# Patient Record
Sex: FEMALE | Race: WHITE | NOT HISPANIC OR LATINO | Employment: FULL TIME | URBAN - METROPOLITAN AREA
[De-identification: names, ages, dates, MRNs, and addresses within clinical notes are randomized per-mention and may not be internally consistent; named-entity substitution may affect disease eponyms.]

---

## 2020-05-23 ENCOUNTER — VIRTUAL VISIT (OUTPATIENT)
Dept: FAMILY MEDICINE | Facility: OTHER | Age: 35
End: 2020-05-23

## 2020-05-24 NOTE — PROGRESS NOTES
"Date: 2020 14:25:03  Clinician: Jimy Servin  Clinician NPI: [id number]  Patient: Claudia Tidwell  Patient : 1985  Patient Address: [address] [place] [address] [address], [place] [address]  Patient Phone: [phone]  Visit Protocol: Sleep  Patient Summary:  Claudia is a 35 year old ( : 1985 ) female who initiated a [facility] for sleep problems. When asked the question \"Please sign me up to receive news, health information and promotions from [facility].\", Claudia responded \"No\".    Phone number: [phone]   The patient DENIES:     Stopping breathing or holding breath while sleeping    Snoring most nights    Waking from sleep out of breath or with dyspnea     Delayed Phase Sleep Disorder  The patient AFFIRMS:   Having a different sleep schedule on work days compared to off days    The patient DENIES:     Sleeping better when the patient can stay up as late as he/she wants without having to get up in the morning    Sleeping much better on the weekend     Restless Leg Syndrome  The patient AFFIRMS:   That his/her legs / body bother him/her most when sitting or lying down    Being unable get comfortable enough to fall asleep    The patient DENIES:     That his/her legs are only comfortable when up and moving around    Achiness or discomfort in legs keeps him/her from sleeping     Chronic Pain  The patient DENIES:   Pain is the main reason he/she has trouble sleeping   Mental health  The patient DENIES:   Having been hospitalized for mental illness   Shift Work Sleep Disorder  The patient AFFIRMS:   Working different shifts that change during the week or month    Often working the night shift    Occasionally working during the time he/she should normally be sleeping    The patient DENIES:   Psychophysiological Insomnia  The patient AFFIRMS:   Worrying about job, family, or friends    Contemplating his/her responsibilities at night    Worrying about how he/she will feel the next day    The " patient DENIES:     Having so many problems that he/she does not know what to do    Feeling overwhelmed    Feeling pessimistic and hopeless about the future    Feeling very anxious before going to bed    Thinking about a million different things when lying in bed     Significant Negatives  The patient reports not having been previously diagnosed with:     Sleep apnea    Restless legs syndrome    Narcolepsy     The patient denies:     Difficulty breathing during the day    Heart failure    Neuromuscular disease    Feeling hopeless    Feeling as if medication or a personal consultation is immediately necessary     Additional information as reported by the patient (free text): I experience involuntary hand, arm, and leg movements while trying to fall asleep. They are short intervals that stop on their own. It keeps me awake longer. I don't take any drugs, smoke, or drink. I pace in my home as much as I can, but am otherwise sedentary. I work two long shifts on the weekends and it's almost impossible to sleep enough between the two. Lately, I have been working 48hr without sleep. 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   Reason for repeat visit for the same protocol within 24 hours:  I didn't understand the second set of questions. I need help with an ongoing sleeping problem which I think needs medication, but I do not need to be tranquilized at a hospital.  See the History of referred by protocol and completed visits section for additional details on the previous visit.    MEDICATIONS: Consuelo oral, ALLERGIES: NKDA  Clinician Response:  Severiano Taylor,  Based upon how you responded to our questions it is possible:     You have restless leg type symptoms    You have insomnia    Working irregular shifts plays a role in your difficulty sleeping     Restless Leg Syndrome  The feeling in your legs is something that is described in many different ways, including chapis,  jumpy, twitchy, pins-and-needles, aching, painful, numb, restless, etc. This type of problem tends to be most noticeable when you are sitting still or doing something quiet like watching TV. It is also more common in the evening hours and around bed time and generally feels better if you can get up and stretch or walk around. There are many causes of Restless Leg Syndrome but people can also have this problem without a specific cause.  Since Restless Leg Syndrome is most often treated with medications, a sleep medicine provider will contact you to discuss therapy.   Work Shift Sleep Disorder  Based upon how you responded to our questions it is possible that working irregular, rotating or night shifts may be playing a significant role in your insomnia complaints. Each of us has a built in tendency to either stay up late at night or get up early in the morning. Being a 'night owl' or 'early bird' is a function of our internal body clock, called your circadian rhythm. When you are forced to keep a sleep schedule that goes against your typical habits (because a job, school or other responsibilities) insomnia can be the result. This is particularly true for people who have to work during hours when most other people would be asleep. Our bodies function best when we can regularly keep the sleep pattern or habits that are most in tune with our internal body clock. If we keep different patterns or irregular sleep and wake up patterns, problems can develop.  Most people who do shift work are not getting enough sleep. The solution for each person is tailored to their natural sleep schedule and their desired work schedule and requires counseling from a sleep specialist. In some cases, medication may be necessary. Please schedule an appointment with a sleep provider.  The first step before your appointment is to keep an accurate diary of your sleep schedule for two weeks before being seen.You can get a sleep diary form by  clicking here or you can use a mobile phone sleep monitoring available on some smartphones.   Insomnia:  Insomnia is generally the inability to sleep when you want to be asleep. There are many types of insomnia, which can include difficulty falling to sleep, waking frequently during the night, difficulty getting back to sleep, waking too early in the morning, or a feeling of shallow, poor quality sleep. This type of pattern can develop for any number of reasons, such as a high stress situation, but most often does not have a specific cause. But the longer that the insomnia last the more likely that our body gets trained to have insomnia. What this means is that when you lie awake at times during the night, it is common for your mind to get overactive. If you lay there long enough, you get annoyed or frustrated with sleep, but may start to ruminate about many different topics and situations. When this happens over a long time, then your body gets accustomed or use to having this happen and hence it makes it more likely to happen. This is one of the major reasons people can feel drowsy while reading or watching TV in the evening but when you go to bed you can feel wide awake. Once sleep becomes a problem, people often 'try harder' to sleep, which most often just makes the problem worse. As you well know, you can't make yourself sleep.  Your insomnia may respond to relaxation exercises, changes in your bedtime routine, or methods to learn to let go of worry and planning. Some methods of treatment can be done on the computer. Often the decision of which method will work for you can be determined by meeting with a sleep specialist. Listed below are some relaxation strategies you can access over the internet.   A common problem for people with insomnia is spending too much time in bed 'trying' to sleep. You really should only be in bed for no more than 7-8 hours per night and should go to bed when you are naturally  sleepy. Spending too much time in bed can lead to being awake and having an 'overactive' mind. One effective way to address this problem is reducing how much time you spend in bed each night. Be careful with driving or other dangerous activities when trying these strategies however. We recommend that you follow these steps to improve your ability to get to sleep and stay asleep:     Set a new sleep schedule where you only spend as much time in bed as you actually sleep during the night. For example, if you spend 8 hours in bed but only sleep 6 hours per night, then only spend 6 hours in bed.    Keep this sleep schedule every day of the week including the weekends for about two weeks.    After two weeks you can add 30 minutes more time in bed if you have been getting to sleep more easily and sleeping more soundly.    If you still aren't sleeping well, reduce the time you spend in bed by 30 minutes but not less than 5 hours per night and keep this schedule for another week or two.    As you are trying these strategies, keep a sleep log to track how well you are keeping consistent sleep patterns.     CAUTION: When using this type of strategy it is important that you aren't doing anything dangerous and are able to drive without becoming drowsy.  When someone lays awake in bed over many nights, your body can actually learn to be awake in bed, mainly because that is what has happened so often. Your body has actually been 'conditioned' or trained to be awake during the night because it has happened over months or years. To break this habit you should try to follow these steps to improve your insomnia:     Set a strict bedtime and rise time with about 8 hours in bed, and keep this schedule every day of the week, including weekends, for example, a bedtime of 10 PM and a wake up time of 6 AM.    Go to bed at the time you picked but only if you are sleepy (falling to sleep and not just tired or fatigued)    Don't lie in bed  for more than 15-30 minutes if you can't sleep. Get up and go do something relaxing like reading until you become drowsy again and then go back to bed.    Get up at the time you picked every day regardless of how much sleep you get that night.    Use the bedroom only for sleep and sex. Do NOT watch TV, read, use the computer, play games on your cell phone or do work while in bed.    Do not take naps during the daytime and avoid any situations where you might get drowsy or fall asleep unintentionally especially in the evening.     The first step before your appointment is to keep an accurate diary of your sleep schedule for two weeks before being seen. You can get a sleep diary form by clicking here or you can use a mobile phone sleep monitoring available on some smartphones.  Relaxation strategies can help slow someone's mind down during the night. Click on the link below to try some relaxation exercises. Remember that relaxation is a skill to be developed so don't expect yourself to be good at these exercises immediately.   http://www.[facility].org/Services/SleepMedicine/Audio/index.htm   Good Sleep Habits     Your bedroom is for sleeping and should be quiet, comfortably cool and dark before you lie down.    You should not have electronic devices such as phones or computers in your bedroom.    Leave electronic equipment in another room on a  if possible as the light and engagement activate the brain and delay or disrupt sleep.    Your bedtime and awakening time should fit your natural tendency to fall asleep and wake up and should not vary much between weekdays and weekends.    No caffeine within 6 hours or alcohol within two hours of bedtime. A good rule is no caffeine after 3 PM.     Additional Information on Sleep  Why do we sleep?  Sleep science tells us that sleep is a biologic necessity that is required to:     Clear toxins from the brain    Remove unnecessary neural connections that accumulate during  the day    Enhance memory and learning     Without adequate sleep, our brain may become impaired in a manner that is similar to being intoxicated.  How much sleep do we need?  The average adult needs 7-8 hours of sleep while young adolescents need up to 9 hours in order to function at their best. Teens have a delay in bedtime that is biology while older adults go to bed earlier.  Aim for 7-8 sleep and a regular schedule; it may take a week or more to eliminate the effects of chronic insufficient sleep.  How should I schedule when to go to sleep?  The timing of sleep is determined genetically for each of us. Some of us are night owls and go to bed late or larks and get up early. The timing of sleep and the amount of sleep we need changes with our age. Try to schedule your sleep time to fit your natural time for feeling sleepy and awakening without an alarm.  What if my sleep schedule does not fit my work schedule?  If you have no trouble falling asleep or getting up during the work week, your work schedule is probably well-matched to your natural sleep schedule. If your sleep is different when working than on vacation or days not working, you may need help from a sleep specialist to adjust your sleep schedule.   Please go to your primary care provider or an urgent care immediately if your sleep problems become too overwhelming for you.   Diagnosis: Restless leg syndrome possible  Diagnosis ICD: G25.81

## 2020-06-05 ENCOUNTER — VIRTUAL VISIT (OUTPATIENT)
Dept: FAMILY MEDICINE | Facility: CLINIC | Age: 35
End: 2020-06-05
Payer: COMMERCIAL

## 2020-06-05 DIAGNOSIS — G47.00 INSOMNIA, UNSPECIFIED TYPE: Primary | ICD-10-CM

## 2020-06-05 PROCEDURE — 99203 OFFICE O/P NEW LOW 30 MIN: CPT | Mod: 95 | Performed by: FAMILY MEDICINE

## 2020-06-05 RX ORDER — TRAZODONE HYDROCHLORIDE 50 MG/1
50-150 TABLET, FILM COATED ORAL AT BEDTIME
Qty: 30 TABLET | Refills: 1 | Status: SHIPPED | OUTPATIENT
Start: 2020-06-05 | End: 2023-11-16

## 2020-06-05 RX ORDER — NORETHINDRONE 0.35 MG/1
TABLET ORAL
COMMUNITY
Start: 2020-05-19

## 2020-06-05 SDOH — HEALTH STABILITY: MENTAL HEALTH: HOW OFTEN DO YOU HAVE A DRINK CONTAINING ALCOHOL?: NEVER

## 2020-06-05 NOTE — PROGRESS NOTES
"Claudia Tidwell is a 35 year old female who is being evaluated via a billable telephone visit.      The patient has been notified of following:     \"This telephone visit will be conducted via a call between you and your physician/provider. We have found that certain health care needs can be provided without the need for a physical exam.  This service lets us provide the care you need with a short phone conversation.  If a prescription is necessary we can send it directly to your pharmacy.  If lab work is needed we can place an order for that and you can then stop by our lab to have the test done at a later time.    Telephone visits are billed at different rates depending on your insurance coverage. During this emergency period, for some insurers they may be billed the same as an in-person visit.  Please reach out to your insurance provider with any questions.    If during the course of the call the physician/provider feels a telephone visit is not appropriate, you will not be charged for this service.\"    Patient has given verbal consent for Telephone visit?  Yes    What phone number would you like to be contacted at? [phone]    How would you like to obtain your AVS? [employer] sent link so she can activate [employer].      Subjective     Claudia Tidwell is a 35 year old female who presents via phone visit today for the following health issues:      HPI   Moved to [place] 2017 was over seas for 10 years.   Patient has not had insurance for a while and know she needs to schedule a physical.   Patient states no Chronic health problems.    Concern - sleep issues  Onset: long time more than several months.    Description:   Trouble staying and falling asleep, exhausted during the day. Patient  staring to get irritated  for no reason. Issuse with career choice and family issues     Progression of Symptoms:  worsening    Accompanying Signs & Symptoms:  NA    Previous history of similar problem:   na    Precipitating factors: "   Worsened by: swing shift    Alleviating factors:  Improved by:     Therapies Tried and outcome: has tried meditation, cut caffeine and this has not been to helpful.         Reviewed and updated as needed this visit by Provider         Review of Systems   Constitutional, HEENT, cardiovascular, pulmonary, gi and gu systems are negative, except as otherwise noted.       Objective   Reported vitals:  There were no vitals taken for this visit.   healthy, alert and no distress  PSYCH: Alert and oriented times 3; coherent speech, normal   rate and volume, able to articulate logical thoughts, able   to abstract reason, no tangential thoughts, no hallucinations   or delusions  Her affect is normal  RESP: No cough, no audible wheezing, able to talk in full sentences  Remainder of exam unable to be completed due to telephone visits    Diagnostic Test Results:  Labs reviewed in Epic        Assessment/Plan:  1. Insomnia, unspecified type    - traZODone (DESYREL) 50 MG tablet; Take 1-3 tablets ( mg) by mouth At Bedtime  Dispense: 30 tablet; Refill: 1    We will do a trial of trazodone with the [facility] follow-up in a week or 2    No follow-ups on file.      Phone call duration: 9 minutes    Frances Avalos DO

## 2021-01-03 ENCOUNTER — HEALTH MAINTENANCE LETTER (OUTPATIENT)
Age: 36
End: 2021-01-03

## 2021-10-10 ENCOUNTER — HEALTH MAINTENANCE LETTER (OUTPATIENT)
Age: 36
End: 2021-10-10

## 2022-01-29 ENCOUNTER — HEALTH MAINTENANCE LETTER (OUTPATIENT)
Age: 37
End: 2022-01-29

## 2022-09-18 ENCOUNTER — HEALTH MAINTENANCE LETTER (OUTPATIENT)
Age: 37
End: 2022-09-18

## 2023-05-07 ENCOUNTER — HEALTH MAINTENANCE LETTER (OUTPATIENT)
Age: 38
End: 2023-05-07

## 2023-11-16 ENCOUNTER — OFFICE VISIT (OUTPATIENT)
Dept: MIDWIFE SERVICES | Facility: CLINIC | Age: 38
End: 2023-11-16
Payer: COMMERCIAL

## 2023-11-16 VITALS
HEIGHT: 64 IN | BODY MASS INDEX: 50.02 KG/M2 | SYSTOLIC BLOOD PRESSURE: 124 MMHG | DIASTOLIC BLOOD PRESSURE: 80 MMHG | WEIGHT: 293 LBS

## 2023-11-16 DIAGNOSIS — N92.6 IRREGULAR PERIODS: Primary | ICD-10-CM

## 2023-11-16 PROBLEM — E66.01 MORBID OBESITY (H): Status: ACTIVE | Noted: 2023-06-19

## 2023-11-16 PROBLEM — G47.33 OBSTRUCTIVE SLEEP APNEA TREATED WITH CONTINUOUS POSITIVE AIRWAY PRESSURE (CPAP): Status: ACTIVE | Noted: 2023-08-15

## 2023-11-16 PROBLEM — R73.03 PREDIABETES: Status: ACTIVE | Noted: 2023-06-20

## 2023-11-16 LAB
ERYTHROCYTE [DISTWIDTH] IN BLOOD BY AUTOMATED COUNT: 12.9 % (ref 10–15)
HCT VFR BLD AUTO: 42.9 % (ref 35–47)
HGB BLD-MCNC: 13.8 G/DL (ref 11.7–15.7)
MCH RBC QN AUTO: 28.8 PG (ref 26.5–33)
MCHC RBC AUTO-ENTMCNC: 32.2 G/DL (ref 31.5–36.5)
MCV RBC AUTO: 89 FL (ref 78–100)
PLATELET # BLD AUTO: 277 10E3/UL (ref 150–450)
RBC # BLD AUTO: 4.8 10E6/UL (ref 3.8–5.2)
WBC # BLD AUTO: 9.5 10E3/UL (ref 4–11)

## 2023-11-16 PROCEDURE — 85027 COMPLETE CBC AUTOMATED: CPT | Performed by: ADVANCED PRACTICE MIDWIFE

## 2023-11-16 PROCEDURE — 84443 ASSAY THYROID STIM HORMONE: CPT | Performed by: ADVANCED PRACTICE MIDWIFE

## 2023-11-16 PROCEDURE — 99204 OFFICE O/P NEW MOD 45 MIN: CPT | Performed by: ADVANCED PRACTICE MIDWIFE

## 2023-11-16 PROCEDURE — 83001 ASSAY OF GONADOTROPIN (FSH): CPT | Performed by: ADVANCED PRACTICE MIDWIFE

## 2023-11-16 PROCEDURE — 84702 CHORIONIC GONADOTROPIN TEST: CPT | Performed by: ADVANCED PRACTICE MIDWIFE

## 2023-11-16 PROCEDURE — 84403 ASSAY OF TOTAL TESTOSTERONE: CPT | Performed by: ADVANCED PRACTICE MIDWIFE

## 2023-11-16 PROCEDURE — 83520 IMMUNOASSAY QUANT NOS NONAB: CPT | Performed by: ADVANCED PRACTICE MIDWIFE

## 2023-11-16 PROCEDURE — 84146 ASSAY OF PROLACTIN: CPT | Performed by: ADVANCED PRACTICE MIDWIFE

## 2023-11-16 PROCEDURE — 36415 COLL VENOUS BLD VENIPUNCTURE: CPT | Performed by: ADVANCED PRACTICE MIDWIFE

## 2023-11-16 RX ORDER — PEDI MV NO.227/FERROUS SULFATE 10 MG
1 TABLET,CHEWABLE ORAL 2 TIMES DAILY
COMMUNITY
Start: 2023-11-10

## 2023-11-16 RX ORDER — PEN NEEDLE, DIABETIC 32GX 5/32"
1 NEEDLE, DISPOSABLE MISCELLANEOUS DAILY
COMMUNITY
Start: 2023-09-05

## 2023-11-16 RX ORDER — SERTRALINE HYDROCHLORIDE 100 MG/1
100 TABLET, FILM COATED ORAL DAILY
COMMUNITY
Start: 2023-10-29

## 2023-11-16 RX ORDER — CALCIUM CARBONATE/VITAMIN D3 500 MG-10
1 TABLET,CHEWABLE ORAL
COMMUNITY
Start: 2023-11-10

## 2023-11-16 RX ORDER — LIRAGLUTIDE 6 MG/ML
3 INJECTION, SOLUTION SUBCUTANEOUS DAILY
COMMUNITY

## 2023-11-16 RX ORDER — HYDROXYZINE HYDROCHLORIDE 25 MG/1
25 TABLET, FILM COATED ORAL EVERY 4 HOURS PRN
COMMUNITY
Start: 2023-11-10

## 2023-11-16 NOTE — PROGRESS NOTES
"GYN/PCOS Clinic Visit    Date of visit: 2023   Chief Complaint: concern for PCOS    HPI:   Claudia Tidwell is a 38 year old . female who presents to clinic today in consultation for PCOS    Periods got very irregular in past 4 yrs.   Started OCP (progesterone-only) to manage periods which helped. When not on OCPs would bleed heavily for weeks at a time. She also reports struggling with hair growth on her chin/face since college.     Has never had workup for PCOS before. Does report history of pelvic US which showed \"benign tumors\" but this was 10 yrs ago.     She is currently planning to have bariatric surgery this winter.     Menarche: age 12-13  Menses are irregular   Absent for  2 mo at the longest  Irregular for 4 yrs  Lasting 5 days (while on OCP)  Menses flow: heavy even with OCP  Patient's last menstrual period was 10/25/2023 (approximate).   She is not currently considering pregnancy.    Fertility: not aware of any, does not have children   Acne: Yes  Hirsutism: Yes thick hair on chin and jawline   Male-pattern hair loss: No  Elevated serum androgen:  NA  BMI: Body mass index is 67.63 kg/m .   Type 2 DM: prediabetic   Cholesterol: not aware of high cholesterol   Mood disorder: Yes anxiety and depression, on Zoloft    Ultrasound: pelvic US ten years ago    Gynecologic History:    STI history: na  Last Pap: May 2023  History of abnormal pap: No  History of cervical procedures: No  Using oral contraceptives for contraception.   The patient is not sexually active       Obstetric History:   OB History    Para Term  AB Living   0 0 0 0 0 0   SAB IAB Ectopic Multiple Live Births   0 0 0 0 0         Medications:  Current Outpatient Medications   Medication    BD PEN NEEDLE JIA 2ND GEN 32G X 4 MM miscellaneous    Calcium Carb-Cholecalciferol 500-10 MG-MCG CHEW    RADHA 0.35 MG tablet    hydrOXYzine (ATARAX) 25 MG tablet    Pediatric Multivitamins-Iron (FLINTSTONES PLUS EXTRA IRON) 18 MG " "CHEW    SAXENDA 18 MG/3ML pen    sertraline (ZOLOFT) 100 MG tablet     No current facility-administered medications for this visit.       Allergy:  No Known Allergies  Patient denies food, latex or environmental allergies.     Past Medical History:  No past medical history on file.    Past Surgical History:  No past surgical history on file.    Social History:  Tobacco use: NA  Alcohol use: NA  Recreational drug use: NA    No family history on file.    Review of Systems:   ROS: 10 point ROS neg other than the symptoms noted above in the HPI.     Physical Exam:  Vitals:    11/16/23 1258   BP: 124/80   Weight: (!) 178.7 kg (394 lb)   Height: 1.626 m (5' 4\")     Body mass index is 67.63 kg/m .    Gen: NAD, Awake and alert, cooperative with exam        Assessment:  Claudia Tidwell is a 38 year old No obstetric history on file. who presents in consultation for PCOS     Plan:    (N92.6) Irregular periods  (primary encounter diagnosis)    Plan: HCG quantitative pregnancy, Prolactin, TSH with        free T4 reflex, Follicle stimulating hormone,         Anti-Mullerian hormone, CBC with platelets,         Testosterone, total, US Pelvic Complete with         Transvaginal                Discussed polycystic ovarian syndrome with Claudia Tidwell.   [place] criteria, two out of three of the following are required to make the diagnosis of PCOS:  ?Oligo- and/or anovulation  ?Clinical and/or biochemical signs of hyperandrogenism  ?Polycystic ovaries (by ultrasound)  Reviewed that other conditions that mimic PCOS must be excluded (eg, disorders that cause oligo/anovulation and/or hyperandrogenism, such as thyroid disease, nonclassic congenital adrenal hyperplasia (NCCAH), hyperprolactinemia, and androgen-secreting tumors).    Follow up after labs/TVUS to determine if she meets criteria for PCOS and to make a follow up plan.       JOSE Tian, SKYLAR                          "

## 2023-11-16 NOTE — NURSING NOTE
"Chief Complaint   Patient presents with    Consult     PCOS?       Initial /80   Ht 1.626 m (5' 4\")   Wt (!) 178.7 kg (394 lb)   LMP 10/25/2023 (Approximate)   BMI 67.63 kg/m   Estimated body mass index is 67.63 kg/m  as calculated from the following:    Height as of this encounter: 1.626 m (5' 4\").    Weight as of this encounter: 178.7 kg (394 lb).  BP completed using cuff size: regular    Questioned patient about current smoking habits.  Pt. has never smoked.      No obstetric history on file.    Ana Gupta LPN on 11/16/2023 at 1:05 PM           "

## 2023-11-17 LAB
FSH SERPL IRP2-ACNC: 5.7 MIU/ML
HCG INTACT+B SERPL-ACNC: <1 MIU/ML
MIS SERPL-MCNC: 0.8 NG/ML (ref 0.15–7.5)
PROLACTIN SERPL 3RD IS-MCNC: 17 NG/ML (ref 5–23)
TSH SERPL DL<=0.005 MIU/L-ACNC: 1.85 UIU/ML (ref 0.3–4.2)

## 2023-11-21 LAB — TESTOST SERPL-MCNC: 48 NG/DL (ref 8–60)

## 2023-12-07 ENCOUNTER — ANCILLARY PROCEDURE (OUTPATIENT)
Dept: ULTRASOUND IMAGING | Facility: CLINIC | Age: 38
End: 2023-12-07
Attending: ADVANCED PRACTICE MIDWIFE
Payer: COMMERCIAL

## 2023-12-07 DIAGNOSIS — N92.6 IRREGULAR PERIODS: ICD-10-CM

## 2023-12-07 PROCEDURE — 76856 US EXAM PELVIC COMPLETE: CPT | Performed by: OBSTETRICS & GYNECOLOGY

## 2023-12-07 PROCEDURE — 76830 TRANSVAGINAL US NON-OB: CPT | Performed by: OBSTETRICS & GYNECOLOGY

## 2024-02-25 ENCOUNTER — HEALTH MAINTENANCE LETTER (OUTPATIENT)
Age: 39
End: 2024-02-25

## 2024-07-14 ENCOUNTER — HEALTH MAINTENANCE LETTER (OUTPATIENT)
Age: 39
End: 2024-07-14

## 2025-07-19 ENCOUNTER — HEALTH MAINTENANCE LETTER (OUTPATIENT)
Age: 40
End: 2025-07-19